# Patient Record
Sex: MALE | URBAN - NONMETROPOLITAN AREA
[De-identification: names, ages, dates, MRNs, and addresses within clinical notes are randomized per-mention and may not be internally consistent; named-entity substitution may affect disease eponyms.]

---

## 2024-02-09 ENCOUNTER — OV NP (OUTPATIENT)
Dept: URBAN - NONMETROPOLITAN AREA CLINIC 2 | Facility: CLINIC | Age: 59
End: 2024-02-09
Payer: OTHER GOVERNMENT

## 2024-02-09 ENCOUNTER — LAB (OUTPATIENT)
Dept: URBAN - NONMETROPOLITAN AREA CLINIC 2 | Facility: CLINIC | Age: 59
End: 2024-02-09

## 2024-02-09 VITALS
TEMPERATURE: 98 F | DIASTOLIC BLOOD PRESSURE: 92 MMHG | SYSTOLIC BLOOD PRESSURE: 164 MMHG | WEIGHT: 222 LBS | HEART RATE: 77 BPM

## 2024-02-09 DIAGNOSIS — R10.13 EPIGASTRIC DISCOMFORT: ICD-10-CM

## 2024-02-09 DIAGNOSIS — Z12.11 COLON CANCER SCREENING: ICD-10-CM

## 2024-02-09 PROCEDURE — 99244 OFF/OP CNSLTJ NEW/EST MOD 40: CPT | Performed by: NURSE PRACTITIONER

## 2024-02-09 RX ORDER — AMLODIPINE BESYLATE 10 MG/1
TAKE ONE TABLET BY MOUTH ONE TIME DAILY IN THE MORNING TABLET ORAL
Qty: 90 UNSPECIFIED | Refills: 1 | Status: ACTIVE | COMMUNITY

## 2024-02-09 RX ORDER — RAMIPRIL 2.5 MG/1
TAKE ONE CAPSULE BY MOUTH ONE TIME DAILY IN THE MORNING CAPSULE ORAL
Qty: 90 UNSPECIFIED | Refills: 0 | Status: ACTIVE | COMMUNITY

## 2024-02-09 RX ORDER — SUCRALFATE 1 G/1
1 TABLET ON AN EMPTY STOMACH TABLET ORAL
Qty: 60 | Refills: 3 | OUTPATIENT
Start: 2024-02-09 | End: 2024-06-08

## 2024-02-09 RX ORDER — OMEPRAZOLE 40 MG/1
1 CAPSULE 30 MINUTES BEFORE MORNING MEAL CAPSULE, DELAYED RELEASE ORAL ONCE A DAY
Qty: 90 | Refills: 3 | OUTPATIENT
Start: 2024-02-09

## 2024-02-09 NOTE — HPI-TODAY'S VISIT:
Mr. Mayo Hurley is a 58-year-old male who presents today for epigastric discomfort.  He was referred by Dr. Alex irwin and a copy of this note and recommendations will be sent to the referring provider's office.  Today Mayo reports that he began noticing tenderness in his epigastric area around 2 years ago.  He works in the  and often travels overseas, last in November 2 ago.  He is headed to [place] in early April followed by [facility] for 4 months.  He has spent more than 24 years serving in the  and spent much of his childhood in [place].  He reports that spicy food often makes his stomach feel better.  When he eats bland food he notes a pit in his stomach and has to use reflux medications.  He is not currently using an acid reducer. He is primarily concerned about a possible malignancy due to the significant chemical and irritant exposure he experienced while working in the .  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  He does not recall the exact type of cancer but would like to rule out esophageal and gastric cancer in him personally.  No family history of GI cancers.  No melena or hematemesis.  LG.

## 2024-02-14 LAB — H PYLORI BREATH TEST: NOT DETECTED

## 2024-04-22 ENCOUNTER — LAB (OUTPATIENT)
Dept: URBAN - METROPOLITAN AREA CLINIC 4 | Facility: CLINIC | Age: 59
End: 2024-04-22
Payer: OTHER GOVERNMENT

## 2024-04-22 ENCOUNTER — EGD W/ DIL (OUTPATIENT)
Dept: URBAN - NONMETROPOLITAN AREA SURGERY CENTER 1 | Facility: SURGERY CENTER | Age: 59
End: 2024-04-22
Payer: OTHER GOVERNMENT

## 2024-04-22 DIAGNOSIS — R10.13 ABDOMINAL DISCOMFORT, EPIGASTRIC: ICD-10-CM

## 2024-04-22 DIAGNOSIS — K31.89 OTHER DISEASES OF STOMACH AND DUODENUM: ICD-10-CM

## 2024-04-22 DIAGNOSIS — K21.9 ACID REFLUX: ICD-10-CM

## 2024-04-22 DIAGNOSIS — R13.19 OTHER DYSPHAGIA: ICD-10-CM

## 2024-04-22 DIAGNOSIS — K29.80 ACUTE DUODENITIS: ICD-10-CM

## 2024-04-22 DIAGNOSIS — K29.81 DUODENITIS WITH BLEEDING: ICD-10-CM

## 2024-04-22 DIAGNOSIS — K21.9 GASTRO-ESOPHAGEAL REFLUX DISEASE WITHOUT ESOPHAGITIS: ICD-10-CM

## 2024-04-22 PROCEDURE — 43239 EGD BIOPSY SINGLE/MULTIPLE: CPT | Performed by: INTERNAL MEDICINE

## 2024-04-22 PROCEDURE — 43249 ESOPH EGD DILATION <30 MM: CPT | Performed by: INTERNAL MEDICINE

## 2024-04-22 PROCEDURE — 88305 TISSUE EXAM BY PATHOLOGIST: CPT | Performed by: PATHOLOGY

## 2024-04-22 RX ORDER — SUCRALFATE 1 G/1
1 TABLET ON AN EMPTY STOMACH TABLET ORAL
Qty: 60 | Refills: 3 | Status: ACTIVE | COMMUNITY
Start: 2024-02-09 | End: 2024-06-08

## 2024-04-22 RX ORDER — OMEPRAZOLE 40 MG/1
1 CAPSULE 30 MINUTES BEFORE MORNING MEAL CAPSULE, DELAYED RELEASE ORAL ONCE A DAY
Qty: 90 | Refills: 3 | Status: ACTIVE | COMMUNITY
Start: 2024-02-09

## 2024-04-22 RX ORDER — AMLODIPINE BESYLATE 10 MG/1
TAKE ONE TABLET BY MOUTH ONE TIME DAILY IN THE MORNING TABLET ORAL
Qty: 90 UNSPECIFIED | Refills: 1 | Status: ACTIVE | COMMUNITY

## 2024-04-22 RX ORDER — RAMIPRIL 2.5 MG/1
TAKE ONE CAPSULE BY MOUTH ONE TIME DAILY IN THE MORNING CAPSULE ORAL
Qty: 90 UNSPECIFIED | Refills: 0 | Status: ACTIVE | COMMUNITY

## 2024-06-12 ENCOUNTER — OFFICE VISIT (OUTPATIENT)
Dept: URBAN - NONMETROPOLITAN AREA CLINIC 2 | Facility: CLINIC | Age: 59
End: 2024-06-12
Payer: OTHER GOVERNMENT

## 2024-06-12 ENCOUNTER — DASHBOARD ENCOUNTERS (OUTPATIENT)
Age: 59
End: 2024-06-12

## 2024-06-12 VITALS
WEIGHT: 225.4 LBS | HEIGHT: 69 IN | DIASTOLIC BLOOD PRESSURE: 89 MMHG | HEART RATE: 58 BPM | BODY MASS INDEX: 33.38 KG/M2 | SYSTOLIC BLOOD PRESSURE: 149 MMHG

## 2024-06-12 DIAGNOSIS — R10.13 EPIGASTRIC DISCOMFORT: ICD-10-CM

## 2024-06-12 DIAGNOSIS — Z12.11 COLON CANCER SCREENING: ICD-10-CM

## 2024-06-12 PROCEDURE — 99214 OFFICE O/P EST MOD 30 MIN: CPT | Performed by: NURSE PRACTITIONER

## 2024-06-12 RX ORDER — OMEPRAZOLE 40 MG/1
1 CAPSULE 30 MINUTES BEFORE MORNING MEAL CAPSULE, DELAYED RELEASE ORAL ONCE A DAY
Qty: 90 | Refills: 3 | Status: ACTIVE | COMMUNITY
Start: 2024-02-09

## 2024-06-12 RX ORDER — AMLODIPINE BESYLATE 10 MG/1
TAKE ONE TABLET BY MOUTH ONE TIME DAILY IN THE MORNING TABLET ORAL
Qty: 90 UNSPECIFIED | Refills: 1 | Status: ACTIVE | COMMUNITY

## 2024-06-12 RX ORDER — OMEPRAZOLE 40 MG/1
1 CAPSULE 30 MINUTES BEFORE MORNING MEAL AND EVENING MEAL CAPSULE, DELAYED RELEASE ORAL TWICE DAILY
Qty: 180 | Refills: 1 | OUTPATIENT

## 2024-06-12 RX ORDER — RAMIPRIL 2.5 MG/1
TAKE ONE CAPSULE BY MOUTH ONE TIME DAILY IN THE MORNING CAPSULE ORAL
Qty: 90 UNSPECIFIED | Refills: 0 | Status: ACTIVE | COMMUNITY

## 2024-06-12 NOTE — HPI-TODAY'S VISIT:
Mr. Mayo Hurley returns for follow-up of epigastric discomfort.  Since his last visit he was started on once daily PPI and has only had 1 episode of breakthrough discomfort since.  He had an EGD 2 months later showing LA grade a esophagitis and peptic duodenitis.  Given the continuation of changes endoscopically we will increase PPI to twice daily dosing.  He is also returning to [facility] for 3 months.  Denies any melena, hematemesis, or weight loss.  LG.

## 2024-06-12 NOTE — HPI-OTHER HISTORIES
2/9/2024: Mr. Mayo Hurley is a 58-year-old male who presents today for epigastric discomfort. He was referred by Dr. Alex irwin and a copy of this note and recommendations will be sent to the referring provider's office. Today Mayo reports that he began noticing tenderness in his epigastric area around 2 years ago. He works in the  and often travels overseas, last in November 2 ago. He is headed to [place] in early April followed by [facility] for 4 months. He has spent more than 24 years serving in the  and spent much of his childhood in [place]. He reports that spicy food often makes his stomach feel better. When he eats bland food he notes a pit in his stomach and has to use reflux medications. He is not currently using an acid reducer. He is primarily concerned about a possible malignancy due to the significant chemical and irritant exposure he experienced while working in the . 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 He does not recall the exact type of cancer but would like to rule out esophageal and gastric cancer in him personally. No family history of GI cancers. No melena or hematemesis. LG.

## 2024-10-02 ENCOUNTER — OFFICE VISIT (OUTPATIENT)
Dept: URBAN - NONMETROPOLITAN AREA CLINIC 2 | Facility: CLINIC | Age: 59
End: 2024-10-02